# Patient Record
Sex: FEMALE | Race: OTHER
[De-identification: names, ages, dates, MRNs, and addresses within clinical notes are randomized per-mention and may not be internally consistent; named-entity substitution may affect disease eponyms.]

---

## 2019-08-01 PROBLEM — Z00.00 ENCOUNTER FOR PREVENTIVE HEALTH EXAMINATION: Status: ACTIVE | Noted: 2019-08-01

## 2019-08-05 ENCOUNTER — APPOINTMENT (OUTPATIENT)
Dept: GASTROENTEROLOGY | Facility: CLINIC | Age: 39
End: 2019-08-05

## 2021-09-14 ENCOUNTER — APPOINTMENT (OUTPATIENT)
Dept: PEDIATRIC ORTHOPEDIC SURGERY | Facility: CLINIC | Age: 41
End: 2021-09-14
Payer: COMMERCIAL

## 2021-09-14 VITALS — BODY MASS INDEX: 29.99 KG/M2 | WEIGHT: 180 LBS | HEIGHT: 65 IN

## 2021-09-14 DIAGNOSIS — Z78.9 OTHER SPECIFIED HEALTH STATUS: ICD-10-CM

## 2021-09-14 DIAGNOSIS — Z83.49 FAMILY HISTORY OF OTHER ENDOCRINE, NUTRITIONAL AND METABOLIC DISEASES: ICD-10-CM

## 2021-09-14 DIAGNOSIS — S60.011A CONTUSION OF RIGHT THUMB W/OUT DAMAGE TO NAIL, INITIAL ENCOUNTER: ICD-10-CM

## 2021-09-14 DIAGNOSIS — F19.90 OTHER PSYCHOACTIVE SUBSTANCE USE, UNSPECIFIED, UNCOMPLICATED: ICD-10-CM

## 2021-09-14 DIAGNOSIS — Z72.89 OTHER PROBLEMS RELATED TO LIFESTYLE: ICD-10-CM

## 2021-09-14 PROCEDURE — 73140 X-RAY EXAM OF FINGER(S): CPT

## 2021-09-14 PROCEDURE — 99202 OFFICE O/P NEW SF 15 MIN: CPT

## 2021-09-14 NOTE — PHYSICAL EXAM
[de-identified] : Her exam today reveals obvious swelling and tenderness to the distal phalanx with slight restriction of flexion to the DIP joint.  There is a resolving hematoma underneath the nail which is intact.\par \par X-rays taken today of the right thumb no evidence of fracture

## 2021-09-14 NOTE — ASSESSMENT
[FreeTextEntry1] : Impression: Contusion right thumb.\par \par This patient will be treated symptomatically with over-the-counter medications and range of motion exercise and ice.  Return as necessary.

## 2021-09-14 NOTE — HISTORY OF PRESENT ILLNESS
[de-identified] : This 41-year-old right-handed pleasant female seen for evaluation of the right thumb.  4 days ago she was using a ladder the ladder closed onto the right thumb crushing the distal phalanx region.  This is caused pain with swelling and stiffness.  Prior to this no complaints.  Past history is noncontributory